# Patient Record
Sex: MALE | URBAN - METROPOLITAN AREA
[De-identification: names, ages, dates, MRNs, and addresses within clinical notes are randomized per-mention and may not be internally consistent; named-entity substitution may affect disease eponyms.]

---

## 2023-01-18 ENCOUNTER — OFFICE VISIT (OUTPATIENT)
Dept: URBAN - METROPOLITAN AREA CLINIC 113 | Facility: CLINIC | Age: 18
End: 2023-01-18
Payer: COMMERCIAL

## 2023-01-18 ENCOUNTER — WEB ENCOUNTER (OUTPATIENT)
Dept: URBAN - METROPOLITAN AREA CLINIC 113 | Facility: CLINIC | Age: 18
End: 2023-01-18

## 2023-01-18 VITALS
BODY MASS INDEX: 18.22 KG/M2 | HEART RATE: 78 BPM | SYSTOLIC BLOOD PRESSURE: 116 MMHG | DIASTOLIC BLOOD PRESSURE: 68 MMHG | HEIGHT: 69 IN | WEIGHT: 123 LBS | RESPIRATION RATE: 18 BRPM | TEMPERATURE: 97.83 F

## 2023-01-18 DIAGNOSIS — R19.7 DIARRHEA, UNSPECIFIED TYPE: ICD-10-CM

## 2023-01-18 DIAGNOSIS — K62.5 BRIGHT RED BLOOD PER RECTUM: ICD-10-CM

## 2023-01-18 DIAGNOSIS — R10.84 GENERALIZED ABDOMINAL PAIN: ICD-10-CM

## 2023-01-18 DIAGNOSIS — R11.2 NAUSEA AND VOMITING, UNSPECIFIED VOMITING TYPE: ICD-10-CM

## 2023-01-18 PROBLEM — 102614006: Status: ACTIVE | Noted: 2023-01-18

## 2023-01-18 PROBLEM — 74474003: Status: ACTIVE | Noted: 2023-01-18

## 2023-01-18 PROBLEM — 16932000: Status: ACTIVE | Noted: 2023-01-18

## 2023-01-18 PROBLEM — 62315008: Status: ACTIVE | Noted: 2023-01-18

## 2023-01-18 PROCEDURE — 99204 OFFICE O/P NEW MOD 45 MIN: CPT | Performed by: NURSE PRACTITIONER

## 2023-01-18 RX ORDER — DICYCLOMINE HYDROCHLORIDE 10 MG/1
1 TABLET CAPSULE ORAL
Qty: 60 | Refills: 3 | OUTPATIENT
Start: 2023-01-18 | End: 2023-05-18

## 2023-01-18 RX ORDER — ONDANSETRON 4 MG/1
1 TABLET ON THE TONGUE AND ALLOW TO DISSOLVE TABLET, ORALLY DISINTEGRATING ORAL ONCE A DAY
Status: ACTIVE | COMMUNITY

## 2023-01-18 RX ORDER — FAMOTIDINE 40 MG/1
1 TABLET AT BEDTIME TABLET, FILM COATED ORAL ONCE A DAY
Qty: 30 | Refills: 5 | OUTPATIENT

## 2023-01-18 RX ORDER — ESCITALOPRAM OXALATE 10 MG/1
1 TABLET TABLET, FILM COATED ORAL ONCE A DAY
Status: ACTIVE | COMMUNITY

## 2023-01-18 NOTE — HPI-TODAY'S VISIT:
This is a 17-year-old male with a history of anxiety presenting for evaluation of abdominal pain, diarrhea, and nausea. He reports 5-6 bowel movements at baseline.  For the last 2 months, his stools been watery.  He admits occasional urgency, infrequent nocturnal symptoms, and occasionally waking in the early morning with the need for a bowel movement.  He reports occasional red blood on the tissue after a bowel movement.  He denies blood in the water or in the stool.  He has achy or crampy abdominal pain that may occur in the lower or upper abdomen.  Occasionally, it is worse after eating, occasionally associated with a bowel movement, and occasionally improving after bowel movement.  Otherwise, it is occurring at random.  For the last 3 months, he has experienced regular episodes of morning nausea.  For the last 2 or 3 weeks, he has experienced intermittent vomiting after eating in the mornings.  He denies heartburn or any other abdominal symptoms.  He has been on Lexapro for anxiety for 6 weeks.  His symptoms predate onset of this medication. He denies a family history of inflammatory bowel disease or colon cancer.  He infrequently ingests dairy products.  He infrequently uses ibuprofen.  He admits infrequent alcohol use, he vapes on a regular basis, and rarely uses marijuana. He has been evaluated at urgent care and in the emergency department in the [facility] for his current symptoms.  Labs were performed.  He was prescribed ondansetron.  He also had labs last Thursday with his primary care provider at [facility] within the last 2 months. He has recently been evaluated at [facility] in [facility].

## 2023-01-24 ENCOUNTER — CLAIMS CREATED FROM THE CLAIM WINDOW (OUTPATIENT)
Dept: URBAN - METROPOLITAN AREA CLINIC 107 | Facility: CLINIC | Age: 18
End: 2023-01-24

## 2023-01-24 ENCOUNTER — CLAIMS CREATED FROM THE CLAIM WINDOW (OUTPATIENT)
Dept: URBAN - METROPOLITAN AREA CLINIC 107 | Facility: CLINIC | Age: 18
End: 2023-01-24
Payer: COMMERCIAL

## 2023-01-24 ENCOUNTER — OFFICE VISIT (OUTPATIENT)
Dept: URBAN - METROPOLITAN AREA CLINIC 107 | Facility: CLINIC | Age: 18
End: 2023-01-24

## 2023-01-24 DIAGNOSIS — R19.7 ACUTE DIARRHEA: ICD-10-CM

## 2023-01-24 LAB
CALPROTECTIN, FECAL: 77
CAMPYLOBACTER SPP. AG,EIA: (no result)
LEUKOCYTES: (no result)
OVA AND PARASITES, CONC AND PERM SMEAR: (no result)
SALMONELLA AND SHIGELLA, CULTURE: (no result)
SHIGA TOXINS, EIA W/RFL TO E.COLI O157 CULTURE: (no result)

## 2023-01-24 PROCEDURE — 993 AGA: Performed by: INTERNAL MEDICINE

## 2023-03-10 ENCOUNTER — OFFICE VISIT (OUTPATIENT)
Dept: URBAN - METROPOLITAN AREA CLINIC 113 | Facility: CLINIC | Age: 18
End: 2023-03-10

## 2023-03-10 RX ORDER — DICYCLOMINE HYDROCHLORIDE 10 MG/1
1 TABLET CAPSULE ORAL
Qty: 60 | Refills: 3 | Status: ACTIVE | COMMUNITY
Start: 2023-01-18 | End: 2023-05-18

## 2023-03-10 RX ORDER — ONDANSETRON 4 MG/1
1 TABLET ON THE TONGUE AND ALLOW TO DISSOLVE TABLET, ORALLY DISINTEGRATING ORAL ONCE A DAY
Status: ACTIVE | COMMUNITY

## 2023-03-10 RX ORDER — FAMOTIDINE 40 MG/1
1 TABLET AT BEDTIME TABLET, FILM COATED ORAL ONCE A DAY
Qty: 30 | Refills: 5 | Status: ACTIVE | COMMUNITY

## 2023-03-10 RX ORDER — ESCITALOPRAM OXALATE 10 MG/1
1 TABLET TABLET, FILM COATED ORAL ONCE A DAY
Status: ACTIVE | COMMUNITY